# Patient Record
(demographics unavailable — no encounter records)

---

## 2025-01-21 NOTE — HISTORY OF PRESENT ILLNESS
[FreeTextEntry1] : 27yo  here for wellness, last seen 2022. [place] IUD place 2024. Generally amenorrheic with IUD. occ has spotting. Getting  this July.  HCM - pap 3/2021 ascus/HPV neg -> 2022 nilm - s/p gardasil  POB: vtop s/p D&C  GYN: reg menses q 28d x 4-5 days, heavy on CD2, no dysmenorrhea, no hx STI/abnormal paps

## 2025-01-21 NOTE — PLAN
[FreeTextEntry1] : IUD in place, Pap done.  Patient screened for depression - no signs of clinical depression. PHQ-9 scores reviewed over the course of the visit 5-10minutes of face to face time. Follow up with changes in mood including other symptoms of anxiety.